# Patient Record
Sex: FEMALE | URBAN - METROPOLITAN AREA
[De-identification: names, ages, dates, MRNs, and addresses within clinical notes are randomized per-mention and may not be internally consistent; named-entity substitution may affect disease eponyms.]

---

## 2021-05-12 ENCOUNTER — APPOINTMENT (RX ONLY)
Dept: URBAN - METROPOLITAN AREA CLINIC 35 | Facility: CLINIC | Age: 41
Setting detail: DERMATOLOGY
End: 2021-05-12

## 2021-05-12 ENCOUNTER — PREPPED CHART (OUTPATIENT)
Dept: URBAN - METROPOLITAN AREA CLINIC 67 | Facility: CLINIC | Age: 41
End: 2021-05-12

## 2021-05-12 DIAGNOSIS — L92.0 GRANULOMA ANNULARE: ICD-10-CM | Status: INADEQUATELY CONTROLLED

## 2021-05-12 PROBLEM — H33.323 TREATED RETINAL HOLE: Noted: 2021-05-12

## 2021-05-12 PROBLEM — H35.372 EPIRETINAL MEMBRANE: Noted: 2021-05-12

## 2021-05-12 PROBLEM — H44.23 DEGENERATIVE MYOPIA: Noted: 2021-05-12

## 2021-05-12 PROBLEM — H33.323 ATROPHIC RETINAL HOLE: Noted: 2021-05-12

## 2021-05-12 PROCEDURE — 99203 OFFICE O/P NEW LOW 30 MIN: CPT

## 2021-05-12 PROCEDURE — ? COUNSELING

## 2021-05-12 PROCEDURE — ? PRESCRIPTION MEDICATION MANAGEMENT

## 2021-05-12 PROCEDURE — ? PRESCRIPTION

## 2021-05-12 RX ORDER — MOMETASONE FUROATE 1 MG/G
CREAM TOPICAL
Qty: 1 | Refills: 2 | Status: ERX | COMMUNITY
Start: 2021-05-12

## 2021-05-12 RX ADMIN — MOMETASONE FUROATE: 1 CREAM TOPICAL at 00:00

## 2021-05-12 ASSESSMENT — LOCATION SIMPLE DESCRIPTION DERM: LOCATION SIMPLE: LEFT HAND

## 2021-05-12 ASSESSMENT — LOCATION DETAILED DESCRIPTION DERM: LOCATION DETAILED: LEFT RADIAL DORSAL HAND

## 2021-05-12 ASSESSMENT — LOCATION ZONE DERM: LOCATION ZONE: HAND

## 2021-05-12 NOTE — PROCEDURE: PRESCRIPTION MEDICATION MANAGEMENT
Render In Strict Bullet Format?: No
Detail Level: Zone
Plan: Start with topical steroids, then consider injection (ILK) if topicals fail

## 2021-06-23 ENCOUNTER — APPOINTMENT (RX ONLY)
Dept: URBAN - METROPOLITAN AREA CLINIC 35 | Facility: CLINIC | Age: 41
Setting detail: DERMATOLOGY
End: 2021-06-23

## 2021-06-23 DIAGNOSIS — L72.0 EPIDERMAL CYST: ICD-10-CM

## 2021-06-23 DIAGNOSIS — L92.0 GRANULOMA ANNULARE: ICD-10-CM | Status: RESOLVING

## 2021-06-23 PROCEDURE — ? PRESCRIPTION MEDICATION MANAGEMENT

## 2021-06-23 PROCEDURE — ? COUNSELING

## 2021-06-23 PROCEDURE — 11900 INJECT SKIN LESIONS </W 7: CPT

## 2021-06-23 PROCEDURE — 99212 OFFICE O/P EST SF 10 MIN: CPT | Mod: 25

## 2021-06-23 PROCEDURE — ? INTRALESIONAL KENALOG

## 2021-06-23 PROCEDURE — ? DIAGNOSIS COMMENT

## 2021-06-23 ASSESSMENT — LOCATION SIMPLE DESCRIPTION DERM
LOCATION SIMPLE: RIGHT CHEEK
LOCATION SIMPLE: LEFT HAND

## 2021-06-23 ASSESSMENT — LOCATION ZONE DERM
LOCATION ZONE: HAND
LOCATION ZONE: FACE

## 2021-06-23 ASSESSMENT — LOCATION DETAILED DESCRIPTION DERM
LOCATION DETAILED: RIGHT CENTRAL MALAR CHEEK
LOCATION DETAILED: LEFT RADIAL DORSAL HAND

## 2021-06-23 NOTE — PROCEDURE: DIAGNOSIS COMMENT
Detail Level: Simple
Render Risk Assessment In Note?: no
Comment: Rash resolving s/p mometasone 0.1% cream. Disc r/b/se of ILK vs non steroid cream. Pt elects for ILK injection today. 0.2 cc ILK 5. Counseled to stop steriod cream. Fu in 6 week s

## 2021-06-23 NOTE — PROCEDURE: INTRALESIONAL KENALOG
Treatment Number (Optional): 1
Detail Level: Detailed
Medical Necessity Clause: This procedure was medically necessary because the lesions that were treated were:
Administered By (Optional): ROBERTO
X Size Of Lesion In Cm (Optional): 0
Expiration Date (Optional): April 2022
Consent: The risks of atrophy were reviewed with the patient.
Total Volume Injected (Ccs- Only Use Numbers And Decimals): 0.2
Kenalog Preparation: Kenalog
Concentration Of Solution Injected (Mg/Ml): 5.0
Include Z78.9 (Other Specified Conditions Influencing Health Status) As An Associated Diagnosis?: No
Validate Note Data When Using Inventory: Yes
Lot # (Optional): [id number]

## 2021-06-23 NOTE — PROCEDURE: COUNSELING
Detail Level: Detailed
Detail Level: Zone
Patient Specific Counseling (Will Not Stick From Patient To Patient): Recommended 5-2 gly-sal wipes; discussed to use 2x/week and counseled about SE of irritation

## 2022-04-22 ASSESSMENT — TONOMETRY
OD_IOP_MMHG: 21
OS_IOP_MMHG: 20

## 2022-04-22 ASSESSMENT — VISUAL ACUITY
OS_CC: 20/20-1
OD_CC: 20/20

## 2022-07-15 ENCOUNTER — FOLLOW UP (OUTPATIENT)
Dept: URBAN - METROPOLITAN AREA CLINIC 91 | Facility: CLINIC | Age: 42
End: 2022-07-15

## 2022-07-15 DIAGNOSIS — H35.372: ICD-10-CM

## 2022-07-15 DIAGNOSIS — H43.393: ICD-10-CM

## 2022-07-15 DIAGNOSIS — H44.23: ICD-10-CM

## 2022-07-15 PROCEDURE — 92134 CPTRZ OPH DX IMG PST SGM RTA: CPT

## 2022-07-15 PROCEDURE — 92201 OPSCPY EXTND RTA DRAW UNI/BI: CPT | Mod: 59

## 2022-07-15 PROCEDURE — 92014 COMPRE OPH EXAM EST PT 1/>: CPT

## 2022-07-15 ASSESSMENT — VISUAL ACUITY
OS_CC: 20/20
OD_CC: 20/20

## 2022-07-15 ASSESSMENT — TONOMETRY
OD_IOP_MMHG: 18
OS_IOP_MMHG: 16

## 2023-07-14 ENCOUNTER — FOLLOW UP (OUTPATIENT)
Dept: URBAN - METROPOLITAN AREA CLINIC 91 | Facility: CLINIC | Age: 43
End: 2023-07-14

## 2023-07-14 DIAGNOSIS — H43.393: ICD-10-CM

## 2023-07-14 DIAGNOSIS — H35.372: ICD-10-CM

## 2023-07-14 DIAGNOSIS — H44.23: ICD-10-CM

## 2023-07-14 PROCEDURE — 92014 COMPRE OPH EXAM EST PT 1/>: CPT

## 2023-07-14 PROCEDURE — 92201 OPSCPY EXTND RTA DRAW UNI/BI: CPT

## 2023-07-14 PROCEDURE — 92134 CPTRZ OPH DX IMG PST SGM RTA: CPT

## 2023-07-14 ASSESSMENT — VISUAL ACUITY
OD_CC: 20/20
OS_CC: 20/20

## 2023-07-14 ASSESSMENT — TONOMETRY
OD_IOP_MMHG: 18
OS_IOP_MMHG: 15

## 2023-08-21 ENCOUNTER — APPOINTMENT (RX ONLY)
Dept: URBAN - METROPOLITAN AREA CLINIC 35 | Facility: CLINIC | Age: 43
Setting detail: DERMATOLOGY
End: 2023-08-21

## 2023-08-21 DIAGNOSIS — L92.0 GRANULOMA ANNULARE: ICD-10-CM

## 2023-08-21 PROCEDURE — ? INTRALESIONAL KENALOG

## 2023-08-21 PROCEDURE — ? COUNSELING

## 2023-08-21 PROCEDURE — ? DIAGNOSIS COMMENT

## 2023-08-21 PROCEDURE — 11900 INJECT SKIN LESIONS </W 7: CPT

## 2023-08-21 ASSESSMENT — LOCATION DETAILED DESCRIPTION DERM: LOCATION DETAILED: LEFT RADIAL DORSAL HAND

## 2023-08-21 ASSESSMENT — LOCATION SIMPLE DESCRIPTION DERM: LOCATION SIMPLE: LEFT HAND

## 2023-08-21 ASSESSMENT — LOCATION ZONE DERM: LOCATION ZONE: HAND

## 2023-08-21 NOTE — PROCEDURE: INTRALESIONAL KENALOG
Detail Level: Detailed
Medical Necessity Clause: This procedure was medically necessary because the lesions that were treated were:
Administered By (Optional): ROBERTO
X Size Of Lesion In Cm (Optional): 0
Expiration Date (Optional): Sep 2024
Consent: The risks of atrophy were reviewed with the patient.
Total Volume Injected (Ccs- Only Use Numbers And Decimals): 0.1
Kenalog Preparation: Kenalog
Concentration Of Solution Injected (Mg/Ml): 5.0
Include Z78.9 (Other Specified Conditions Influencing Health Status) As An Associated Diagnosis?: No
Validate Note Data When Using Inventory: Yes
Lot # (Optional): [id number]
Kenalog Type Of Vial: Multiple Dose
Which Kenalog Vial Was Used?: Kenalog 10 mg/ml (5 ml vial)

## 2024-08-12 ENCOUNTER — FOLLOW UP (OUTPATIENT)
Dept: URBAN - METROPOLITAN AREA CLINIC 91 | Facility: CLINIC | Age: 44
End: 2024-08-12

## 2024-08-12 DIAGNOSIS — H44.23: ICD-10-CM

## 2024-08-12 DIAGNOSIS — H35.372: ICD-10-CM

## 2024-08-12 DIAGNOSIS — H43.393: ICD-10-CM

## 2024-08-12 PROCEDURE — 92134 CPTRZ OPH DX IMG PST SGM RTA: CPT

## 2024-08-12 PROCEDURE — 92201 OPSCPY EXTND RTA DRAW UNI/BI: CPT

## 2024-08-12 PROCEDURE — 92014 COMPRE OPH EXAM EST PT 1/>: CPT

## 2024-08-12 ASSESSMENT — TONOMETRY
OS_IOP_MMHG: 10
OD_IOP_MMHG: 15

## 2024-08-12 ASSESSMENT — VISUAL ACUITY
OD_CC: 20/20
OS_CC: 20/25+2

## 2024-11-08 ENCOUNTER — APPOINTMENT (RX ONLY)
Dept: URBAN - METROPOLITAN AREA CLINIC 35 | Facility: CLINIC | Age: 44
Setting detail: DERMATOLOGY
End: 2024-11-08

## 2024-11-08 DIAGNOSIS — L92.0 GRANULOMA ANNULARE: ICD-10-CM

## 2024-11-08 PROCEDURE — 11900 INJECT SKIN LESIONS </W 7: CPT | Mod: Q6

## 2024-11-08 PROCEDURE — ? DIAGNOSIS COMMENT

## 2024-11-08 PROCEDURE — ? LOCUM TENENS SERVICE

## 2024-11-08 PROCEDURE — ? INTRALESIONAL KENALOG

## 2024-11-08 PROCEDURE — ? COUNSELING

## 2024-11-08 ASSESSMENT — LOCATION SIMPLE DESCRIPTION DERM
LOCATION SIMPLE: LEFT HAND
LOCATION SIMPLE: RIGHT MIDDLE FINGER
LOCATION SIMPLE: RIGHT HAND

## 2024-11-08 ASSESSMENT — LOCATION ZONE DERM
LOCATION ZONE: HAND
LOCATION ZONE: FINGER

## 2024-11-08 ASSESSMENT — LOCATION DETAILED DESCRIPTION DERM
LOCATION DETAILED: RIGHT PROXIMAL DORSAL MIDDLE FINGER
LOCATION DETAILED: RIGHT DORSAL MIDDLE FINGER METACARPOPHALANGEAL JOINT
LOCATION DETAILED: LEFT RADIAL DORSAL HAND

## 2024-11-08 NOTE — PROCEDURE: INTRALESIONAL KENALOG
Detail Level: Detailed
Medical Necessity Clause: This procedure was medically necessary because the lesions that were treated were:
Administered By (Optional): Dr. Nelly Enciso
X Size Of Lesion In Cm (Optional): 0
Expiration Date For Kenalog (Optional): 12/2025
Consent: The risks of atrophy were reviewed with the patient.
Total Volume (Ccs): 0.2
Kenalog Preparation: Kenalog
Concentration Of Kenalog Solution Injected (Mg/Ml): 5.0
Ndc# For Kenalog Only: 2392-4036-04
Include Z78.9 (Other Specified Conditions Influencing Health Status) As An Associated Diagnosis?: No
Validate Note Data When Using Inventory: Yes
Lot # For Kenalog (Optional): [id number]
Kenalog Type Of Vial: Multiple Dose
Which Kenalog Vial Was Used?: Kenalog 10 mg/ml (5 ml vial)

## 2024-11-08 NOTE — PROCEDURE: DIAGNOSIS COMMENT
Comment: Patient reports flares occur during stressful periods.
Detail Level: Simple
Render Risk Assessment In Note?: no

## 2024-11-08 NOTE — PROCEDURE: LOCUM TENENS SERVICE
Body Of Procedure: I am operating as a locum tenens provider for Dianna Jones MD.
Detail Level: Detailed

## 2025-08-15 ENCOUNTER — FOLLOW UP (OUTPATIENT)
Dept: URBAN - METROPOLITAN AREA CLINIC 91 | Facility: CLINIC | Age: 45
End: 2025-08-15

## 2025-08-15 DIAGNOSIS — H44.23: ICD-10-CM

## 2025-08-15 DIAGNOSIS — H35.372: ICD-10-CM

## 2025-08-15 DIAGNOSIS — H43.393: ICD-10-CM

## 2025-08-15 PROCEDURE — 92201 OPSCPY EXTND RTA DRAW UNI/BI: CPT

## 2025-08-15 PROCEDURE — 92134 CPTRZ OPH DX IMG PST SGM RTA: CPT

## 2025-08-15 PROCEDURE — 92014 COMPRE OPH EXAM EST PT 1/>: CPT

## 2025-08-15 ASSESSMENT — TONOMETRY
OD_IOP_MMHG: 13
OS_IOP_MMHG: 13

## 2025-08-15 ASSESSMENT — VISUAL ACUITY
OS_CC: 20/20
OD_CC: 20/20